# Patient Record
Sex: FEMALE | Race: BLACK OR AFRICAN AMERICAN | Employment: STUDENT | URBAN - METROPOLITAN AREA
[De-identification: names, ages, dates, MRNs, and addresses within clinical notes are randomized per-mention and may not be internally consistent; named-entity substitution may affect disease eponyms.]

---
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
## 2023-06-07 ENCOUNTER — APPOINTMENT (OUTPATIENT)
Age: 23
End: 2023-06-07
Payer: MEDICAID

## 2023-06-07 ENCOUNTER — HOSPITAL ENCOUNTER (EMERGENCY)
Age: 23
Discharge: HOME | End: 2023-06-07
Attending: EMERGENCY MEDICINE
Payer: MEDICAID

## 2023-06-07 VITALS
DIASTOLIC BLOOD PRESSURE: 83 MMHG | SYSTOLIC BLOOD PRESSURE: 142 MMHG | TEMPERATURE: 98.4 F | RESPIRATION RATE: 16 BRPM | OXYGEN SATURATION: 97 % | WEIGHT: 254.6 LBS | HEART RATE: 71 BPM

## 2023-06-07 DIAGNOSIS — R56.9 SEIZURE-LIKE ACTIVITY (HCC): Primary | ICD-10-CM

## 2023-06-07 DIAGNOSIS — E87.6 HYPOKALEMIA: ICD-10-CM

## 2023-06-07 DIAGNOSIS — F29 PSYCHOSIS, UNSPECIFIED PSYCHOSIS TYPE (HCC): ICD-10-CM

## 2023-06-07 LAB
ANION GAP SERPL CALC-SCNC: 4 MMOL/L (ref 3–18)
APAP SERPL-MCNC: <2 UG/ML (ref 10–30)
BASOPHILS # BLD: 0.1 K/UL (ref 0–0.1)
BASOPHILS NFR BLD: 1 % (ref 0–2)
BUN SERPL-MCNC: 6 MG/DL (ref 7–18)
BUN/CREAT SERPL: 8 (ref 12–20)
CA-I BLD-MCNC: 8.6 MG/DL (ref 8.5–10.1)
CHLORIDE SERPL-SCNC: 108 MMOL/L (ref 100–111)
CO2 SERPL-SCNC: 28 MMOL/L (ref 21–32)
CREAT SERPL-MCNC: 0.73 MG/DL (ref 0.6–1.3)
DIFFERENTIAL METHOD BLD: ABNORMAL
EOSINOPHIL # BLD: 0.2 K/UL (ref 0–0.4)
EOSINOPHIL NFR BLD: 2 % (ref 0–5)
ERYTHROCYTE [DISTWIDTH] IN BLOOD BY AUTOMATED COUNT: 18 % (ref 11.6–14.5)
ETHANOL SERPL-MCNC: <3 MG/DL (ref 0–3)
GLUCOSE SERPL-MCNC: 118 MG/DL (ref 74–99)
HCG SERPL QL: NEGATIVE
HCT VFR BLD AUTO: 34.7 % (ref 35–45)
HGB BLD-MCNC: 10.3 G/DL (ref 12–16)
IMM GRANULOCYTES # BLD AUTO: 0.1 K/UL (ref 0–0.04)
IMM GRANULOCYTES NFR BLD AUTO: 1 % (ref 0–0.5)
LYMPHOCYTES # BLD: 2.6 K/UL (ref 0.9–3.6)
LYMPHOCYTES NFR BLD: 25 % (ref 21–52)
MCH RBC QN AUTO: 23.1 PG (ref 24–34)
MCHC RBC AUTO-ENTMCNC: 29.7 G/DL (ref 31–37)
MCV RBC AUTO: 78 FL (ref 78–100)
MONOCYTES # BLD: 0.3 K/UL (ref 0.05–1.2)
MONOCYTES NFR BLD: 3 % (ref 3–10)
NEUTS SEG # BLD: 7.3 K/UL (ref 1.8–8)
NEUTS SEG NFR BLD: 68 % (ref 40–73)
NRBC # BLD: 0 K/UL (ref 0–0.01)
NRBC BLD-RTO: 0 PER 100 WBC
PLATELET # BLD AUTO: 374 K/UL (ref 135–420)
PMV BLD AUTO: 9.3 FL (ref 9.2–11.8)
POTASSIUM SERPL-SCNC: 3 MMOL/L (ref 3.5–5.5)
RBC # BLD AUTO: 4.45 M/UL (ref 4.2–5.3)
SALICYLATES SERPL-MCNC: 3.2 MG/DL (ref 2.8–20)
SODIUM SERPL-SCNC: 140 MMOL/L (ref 136–145)
WBC # BLD AUTO: 10.4 K/UL (ref 4.6–13.2)

## 2023-06-07 PROCEDURE — 36415 COLL VENOUS BLD VENIPUNCTURE: CPT

## 2023-06-07 PROCEDURE — 70450 CT HEAD/BRAIN W/O DYE: CPT

## 2023-06-07 PROCEDURE — 80143 DRUG ASSAY ACETAMINOPHEN: CPT

## 2023-06-07 PROCEDURE — 99284 EMERGENCY DEPT VISIT MOD MDM: CPT

## 2023-06-07 PROCEDURE — 80048 BASIC METABOLIC PNL TOTAL CA: CPT

## 2023-06-07 PROCEDURE — 85025 COMPLETE CBC W/AUTO DIFF WBC: CPT

## 2023-06-07 PROCEDURE — 84703 CHORIONIC GONADOTROPIN ASSAY: CPT

## 2023-06-07 PROCEDURE — 82077 ASSAY SPEC XCP UR&BREATH IA: CPT

## 2023-06-07 PROCEDURE — 80179 DRUG ASSAY SALICYLATE: CPT

## 2023-06-07 PROCEDURE — 80178 ASSAY OF LITHIUM: CPT

## 2023-06-07 PROCEDURE — 6370000000 HC RX 637 (ALT 250 FOR IP): Performed by: EMERGENCY MEDICINE

## 2023-06-07 RX ORDER — POTASSIUM CHLORIDE 750 MG/1
20 TABLET, EXTENDED RELEASE ORAL ONCE
Status: COMPLETED | OUTPATIENT
Start: 2023-06-07 | End: 2023-06-07

## 2023-06-07 RX ORDER — LORAZEPAM 1 MG/1
1 TABLET ORAL ONCE
Status: COMPLETED | OUTPATIENT
Start: 2023-06-07 | End: 2023-06-07

## 2023-06-07 RX ORDER — LITHIUM CARBONATE 150 MG/1
450 CAPSULE ORAL 2 TIMES DAILY WITH MEALS
COMMUNITY

## 2023-06-07 RX ADMIN — LORAZEPAM 1 MG: 1 TABLET ORAL at 20:19

## 2023-06-07 RX ADMIN — POTASSIUM CHLORIDE 20 MEQ: 750 TABLET, EXTENDED RELEASE ORAL at 21:32

## 2023-06-07 ASSESSMENT — PAIN - FUNCTIONAL ASSESSMENT: PAIN_FUNCTIONAL_ASSESSMENT: NONE - DENIES PAIN

## 2023-06-07 NOTE — ED NOTES
Hx of seizures per EMS. EMS picked her up at [facility] in [place]. Pt states \"implant makes her have seizures\". She wants it out. Pt has no allergies.      Ismael Lawrence  06/07/23 2000

## 2023-06-08 LAB — LITHIUM SERPL-SCNC: 0.5 MMOL/L (ref 0.6–1.2)

## 2023-06-08 NOTE — DISCHARGE INSTRUCTIONS
Return for pain, fever not resolving with motrin or tylenol, shortness of breath, vomiting, decreased fluid intake, weakness, numbness, dizziness, or any change or concerns or any worsening depression/suicidal thoughts, or thoughts of hurting anyone else.

## 2023-06-08 NOTE — ED NOTES
EMS states they witnessed pt seizure like activity. EMS stated patient was awake, alert, oriented x4 stating I am seizing. EMS states patients fingers were wiggling and she was blinking her eyes. They stated patient was able to get up and walk to the stretcher while this seizure like activity was happening.       Azra Thomas RN  06/07/23 2010

## 2023-06-08 NOTE — ED NOTES
In to see patient who arrived via ems. Pt being very uncooperative with triage. Pt stated \"I did not even want to come here. \" I asked patient would she liked to be seen. Pt stated \"I don't know, yall cant do anything for me and I dont have a ride home. \" Pt Then stated \"I told them I did not want to come. \" When asked what her concerns were this evening patient stated she wanted the implant out of her arm. Pt was brought in for seizures by EMS. Pt states she does not want anything to be done about her seizures but wants the implant out of her arm. Advised patient MD would be happy to see her if she wanted to be seen. Pt then stated \" Just go on, yall aint listening to me, just go on. \" Asked patient again if she would like to be seen and informed her we would be happy to address her concerns. Pt then stated \" Well I'm here aint I. \" Md made aware and in room speaking with patient.       Lambert Dubose RN  06/07/23 2007

## 2023-06-08 NOTE — ED NOTES
Pt states she sees people who are not there and hears voices. Pt denies Suicidal or homicidal ideation.       Emili Bhat RN  06/07/23 2024

## 2023-06-08 NOTE — ED PROVIDER NOTES
medications on file     Controlled Substances Monitoring:     No flowsheet data found.     (Please note that portions of this note were completed with a voice recognition program.  Efforts were made to edit the dictations but occasionally words are mis-transcribed.)    Naveed Gomez MD (electronically signed)  Attending Emergency Physician          Omaira Alexander MD  06/08/23 8486

## 2023-06-08 NOTE — ED NOTES
Pt asked to use the restroom, was given a urine specimen cup, however CT tech came to get pt and she stated she was unable to give sample     Lindsey Roberto  06/07/23 2055

## 2023-07-21 ENCOUNTER — HOSPITAL ENCOUNTER (INPATIENT)
Facility: HOSPITAL | Age: 23
LOS: 9 days | Discharge: HOME | DRG: 753 | End: 2023-07-31
Attending: STUDENT IN AN ORGANIZED HEALTH CARE EDUCATION/TRAINING PROGRAM | Admitting: PSYCHIATRY & NEUROLOGY
Payer: MEDICAID

## 2023-07-21 DIAGNOSIS — F31.9 BIPOLAR 1 DISORDER (HCC): ICD-10-CM

## 2023-07-21 DIAGNOSIS — R45.851 SUICIDAL IDEATION: Primary | ICD-10-CM

## 2023-07-21 DIAGNOSIS — F32.A DEPRESSION, UNSPECIFIED DEPRESSION TYPE: ICD-10-CM

## 2023-07-21 DIAGNOSIS — F20.9 SCHIZOPHRENIA, UNSPECIFIED TYPE (HCC): ICD-10-CM

## 2023-07-21 LAB
ALBUMIN SERPL-MCNC: 3.4 G/DL (ref 3.4–5)
ALBUMIN/GLOB SERPL: 0.8 (ref 0.8–1.7)
ALP SERPL-CCNC: 59 U/L (ref 45–117)
ALT SERPL-CCNC: 14 U/L (ref 13–56)
AMPHET UR QL SCN: NEGATIVE
ANION GAP SERPL CALC-SCNC: 4 MMOL/L (ref 3–18)
APAP SERPL-MCNC: <2 UG/ML (ref 10–30)
APPEARANCE UR: CLEAR
AST SERPL-CCNC: 5 U/L (ref 10–38)
BARBITURATES UR QL SCN: NEGATIVE
BASOPHILS # BLD: 0 K/UL (ref 0–0.1)
BASOPHILS NFR BLD: 0 % (ref 0–2)
BENZODIAZ UR QL: NEGATIVE
BILIRUB SERPL-MCNC: 0.2 MG/DL (ref 0.2–1)
BILIRUB UR QL: NEGATIVE
BUN SERPL-MCNC: 10 MG/DL (ref 7–18)
BUN/CREAT SERPL: 14 (ref 12–20)
CALCIUM SERPL-MCNC: 8.7 MG/DL (ref 8.5–10.1)
CANNABINOIDS UR QL SCN: NEGATIVE
CHLORIDE SERPL-SCNC: 108 MMOL/L (ref 100–111)
CO2 SERPL-SCNC: 26 MMOL/L (ref 21–32)
COCAINE UR QL SCN: NEGATIVE
COLOR UR: YELLOW
CREAT SERPL-MCNC: 0.7 MG/DL (ref 0.6–1.3)
DIFFERENTIAL METHOD BLD: ABNORMAL
EOSINOPHIL # BLD: 0.2 K/UL (ref 0–0.4)
EOSINOPHIL NFR BLD: 2 % (ref 0–5)
ERYTHROCYTE [DISTWIDTH] IN BLOOD BY AUTOMATED COUNT: 14.6 % (ref 11.6–14.5)
ETHANOL SERPL-MCNC: <3 MG/DL (ref 0–3)
GLOBULIN SER CALC-MCNC: 4.1 G/DL (ref 2–4)
GLUCOSE SERPL-MCNC: 120 MG/DL (ref 74–99)
GLUCOSE UR STRIP.AUTO-MCNC: NEGATIVE MG/DL
HCG SERPL-ACNC: <1 MIU/ML (ref 0–10)
HCT VFR BLD AUTO: 33.9 % (ref 35–45)
HGB BLD-MCNC: 10.1 G/DL (ref 12–16)
HGB UR QL STRIP: NEGATIVE
IMM GRANULOCYTES # BLD AUTO: 0.1 K/UL (ref 0–0.04)
IMM GRANULOCYTES NFR BLD AUTO: 0 % (ref 0–0.5)
KETONES UR QL STRIP.AUTO: NEGATIVE MG/DL
LEUKOCYTE ESTERASE UR QL STRIP.AUTO: NEGATIVE
LYMPHOCYTES # BLD: 3.3 K/UL (ref 0.9–3.6)
LYMPHOCYTES NFR BLD: 27 % (ref 21–52)
Lab: NORMAL
MAGNESIUM SERPL-MCNC: 2.3 MG/DL (ref 1.6–2.6)
MCH RBC QN AUTO: 23.1 PG (ref 24–34)
MCHC RBC AUTO-ENTMCNC: 29.8 G/DL (ref 31–37)
MCV RBC AUTO: 77.6 FL (ref 78–100)
METHADONE UR QL: NEGATIVE
MONOCYTES # BLD: 0.4 K/UL (ref 0.05–1.2)
MONOCYTES NFR BLD: 3 % (ref 3–10)
NEUTS SEG # BLD: 8 K/UL (ref 1.8–8)
NEUTS SEG NFR BLD: 67 % (ref 40–73)
NITRITE UR QL STRIP.AUTO: NEGATIVE
NRBC # BLD: 0 K/UL (ref 0–0.01)
NRBC BLD-RTO: 0 PER 100 WBC
OPIATES UR QL: NEGATIVE
PCP UR QL: NEGATIVE
PH UR STRIP: 6 (ref 5–8)
PLATELET # BLD AUTO: 390 K/UL (ref 135–420)
PMV BLD AUTO: 9.2 FL (ref 9.2–11.8)
POTASSIUM SERPL-SCNC: 3.4 MMOL/L (ref 3.5–5.5)
PROT SERPL-MCNC: 7.5 G/DL (ref 6.4–8.2)
PROT UR STRIP-MCNC: NEGATIVE MG/DL
RBC # BLD AUTO: 4.37 M/UL (ref 4.2–5.3)
SALICYLATES SERPL-MCNC: <1.7 MG/DL (ref 2.8–20)
SODIUM SERPL-SCNC: 138 MMOL/L (ref 136–145)
SP GR UR REFRACTOMETRY: 1.01 (ref 1–1.03)
UROBILINOGEN UR QL STRIP.AUTO: 0.2 EU/DL (ref 0.2–1)
WBC # BLD AUTO: 11.9 K/UL (ref 4.6–13.2)

## 2023-07-21 PROCEDURE — 80307 DRUG TEST PRSMV CHEM ANLYZR: CPT

## 2023-07-21 PROCEDURE — 80053 COMPREHEN METABOLIC PANEL: CPT

## 2023-07-21 PROCEDURE — 84702 CHORIONIC GONADOTROPIN TEST: CPT

## 2023-07-21 PROCEDURE — 80143 DRUG ASSAY ACETAMINOPHEN: CPT

## 2023-07-21 PROCEDURE — 85025 COMPLETE CBC W/AUTO DIFF WBC: CPT

## 2023-07-21 PROCEDURE — 80179 DRUG ASSAY SALICYLATE: CPT

## 2023-07-21 PROCEDURE — 6370000000 HC RX 637 (ALT 250 FOR IP): Performed by: NURSE PRACTITIONER

## 2023-07-21 PROCEDURE — 82077 ASSAY SPEC XCP UR&BREATH IA: CPT

## 2023-07-21 PROCEDURE — 99285 EMERGENCY DEPT VISIT HI MDM: CPT

## 2023-07-21 PROCEDURE — 81003 URINALYSIS AUTO W/O SCOPE: CPT

## 2023-07-21 PROCEDURE — 83735 ASSAY OF MAGNESIUM: CPT

## 2023-07-21 RX ORDER — POTASSIUM CHLORIDE 20 MEQ/1
20 TABLET, EXTENDED RELEASE ORAL ONCE
Status: COMPLETED | OUTPATIENT
Start: 2023-07-21 | End: 2023-07-21

## 2023-07-21 RX ADMIN — POTASSIUM CHLORIDE 20 MEQ: 1500 TABLET, EXTENDED RELEASE ORAL at 20:10

## 2023-07-21 ASSESSMENT — PAIN DESCRIPTION - PAIN TYPE: TYPE: CHRONIC PAIN

## 2023-07-21 ASSESSMENT — LIFESTYLE VARIABLES
HOW MANY STANDARD DRINKS CONTAINING ALCOHOL DO YOU HAVE ON A TYPICAL DAY: 1 OR 2
HOW OFTEN DO YOU HAVE A DRINK CONTAINING ALCOHOL: MONTHLY OR LESS

## 2023-07-21 ASSESSMENT — PAIN DESCRIPTION - LOCATION: LOCATION: GENERALIZED

## 2023-07-21 ASSESSMENT — PAIN SCALES - GENERAL: PAINLEVEL_OUTOF10: 5

## 2023-07-21 ASSESSMENT — PAIN DESCRIPTION - DESCRIPTORS: DESCRIPTORS: ACHING

## 2023-07-21 ASSESSMENT — PAIN DESCRIPTION - ORIENTATION: ORIENTATION: RIGHT

## 2023-07-21 NOTE — ED NOTES
PT changed into paper scrubs and belonings were placed in a single bag in locker three bottom shelf.      Coty Less  07/21/23 4259

## 2023-07-21 NOTE — ED PROVIDER NOTES
either signs or Co-signs this chart in the absence of a cardiologist.    RADIOLOGY:   Non-plain film images such as CT, Ultrasound and MRI are read by the radiologist. Plain radiographic images are visualized and preliminarily interpreted by the emergency physician with the below findings:    Radiologic Studies -   No orders to display       The laboratory results, imaging results, and other diagnostic exams were reviewed in the EMR. Medical Decision Making   I am the first provider for this patient. I reviewed the vital signs, available nursing notes, past medical history, past surgical history, family history and social history. Vital Signs-Reviewed the patient's vital signs. Records Reviewed: Nursing Notes and Old Medical Records Personally, on initial evaluation (Time of Review: 10:29 PM)      ED Course: Progress Notes, Reevaluation, and Consults:  12 59-year-old female presents emergency department with reports of SI no plan. Will obtain lab work and imaging for further evaluation of patients complaint. Will continue to monitor and evaluate patient while in the ED. Orders as below:  Orders Placed This Encounter   Procedures    CBC with Auto Differential    CMP    Salicylate    ETOH    Magnesium    Urine Drug Screen    HCG, Quantitative, Pregnancy    Urinalysis    Acetaminophen Level    ADULT DIET; Regular; Safety Tray; Safety Tray (Disposables)    Complete CSSRS Screen    Complete SBIRT Screen    IP CONSULT TO BSMART    Suicide precautions (Select if Suicidal)      CBC shows mild anemia. No signs of infection   acetaminophen level less than 2   Salicylate less than 1.7  Urine drug screen negative   ethanol less than 3   hCG negative   CMP does show potassium 3.4 replaced with 20 mEq of potassium. Medically cleared for psych        [phone] patient has been accepted to inpatient psych. Awaiting bed placement. Diagnosis     Clinical Impression:   1. Suicidal ideation    2.  Depression,

## 2023-07-21 NOTE — ED TRIAGE NOTES
Client reports having thoughts of hurting herself 15min prior to arrival with no plan. Client reports taking her meds as schedule but medication isn't  therapeutic at this present time. Client reports having life stresses that she is dealing with. AXOX4. Reports needing to speak to anoop and get help with better medication managament.

## 2023-07-22 PROCEDURE — 1240000000 HC EMOTIONAL WELLNESS R&B

## 2023-07-22 PROCEDURE — 6370000000 HC RX 637 (ALT 250 FOR IP): Performed by: PSYCHIATRY & NEUROLOGY

## 2023-07-22 PROCEDURE — 99221 1ST HOSP IP/OBS SF/LOW 40: CPT | Performed by: PSYCHIATRY & NEUROLOGY

## 2023-07-22 RX ORDER — FERROUS SULFATE 325(65) MG
325 TABLET ORAL
Status: DISCONTINUED | OUTPATIENT
Start: 2023-07-23 | End: 2023-07-31 | Stop reason: HOSPADM

## 2023-07-22 RX ORDER — POLYETHYLENE GLYCOL 3350 17 G/17G
17 POWDER, FOR SOLUTION ORAL DAILY PRN
Status: DISCONTINUED | OUTPATIENT
Start: 2023-07-22 | End: 2023-07-31 | Stop reason: HOSPADM

## 2023-07-22 RX ORDER — ACETAMINOPHEN 325 MG/1
650 TABLET ORAL EVERY 4 HOURS PRN
Status: DISCONTINUED | OUTPATIENT
Start: 2023-07-22 | End: 2023-07-31 | Stop reason: HOSPADM

## 2023-07-22 RX ADMIN — LITHIUM CARBONATE 450 MG: 300 CAPSULE, GELATIN COATED ORAL at 21:47

## 2023-07-22 ASSESSMENT — SLEEP AND FATIGUE QUESTIONNAIRES
SLEEP PATTERN: DIFFICULTY FALLING ASLEEP
AVERAGE NUMBER OF SLEEP HOURS: 1
DO YOU USE A SLEEP AID: NO
DO YOU HAVE DIFFICULTY SLEEPING: YES

## 2023-07-22 NOTE — ED NOTES
Assumed care of pt reports pain 5/10 generalized right side pain chronic. Pt reports family support from grandmother and has 1 son. Pt reports looking for medication management and outpatient support.       Zafar Cano RN  07/21/23 2017

## 2023-07-22 NOTE — ED NOTES
Pt to ED denies SI/HI at this time. Pt sates was upset and stressed and overwhelmed at the time of triage was SI denied HI at that time. Pt is calm cooperative and talkative. Pt alert and oriented.       Graciela Cantu RN  07/21/23 2008

## 2023-07-22 NOTE — CONSULTS
regular rate and rhythm, S1, S2 normal, no murmur, click, rub or gallop  Lungs: chest clear, no wheezing, rales, normal symmetric air entry    Abdomen: Soft, non-distended, non-TTP, no organomegaly, no masses  Ext: No calf tenderness, peripheral pulses present, no significant edema.   Skin: warm, dry, intact, no significant rashes/petechia/ecchymosis appreciated  Neuro: No focal neurologic deficits or gross abnormalities  Psych: A&Ox3, mildly pressured speech, calm    LABS, IMAGING, AND DIAGNOSTIC STUDIES  Recent Results (from the past 24 hour(s))   CBC with Auto Differential    Collection Time: 07/21/23  6:26 PM   Result Value Ref Range    WBC 11.9 4.6 - 13.2 K/uL    RBC 4.37 4.20 - 5.30 M/uL    Hemoglobin 10.1 (L) 12.0 - 16.0 g/dL    Hematocrit 33.9 (L) 35.0 - 45.0 %    MCV 77.6 (L) 78.0 - 100.0 FL    MCH 23.1 (L) 24.0 - 34.0 PG    MCHC 29.8 (L) 31.0 - 37.0 g/dL    RDW 14.6 (H) 11.6 - 14.5 %    Platelets 326 439 - 064 K/uL    MPV 9.2 9.2 - 11.8 FL    Nucleated RBCs 0.0 0  WBC    nRBC 0.00 0.00 - 0.01 K/uL    Neutrophils % 67 40 - 73 %    Lymphocytes % 27 21 - 52 %    Monocytes % 3 3 - 10 %    Eosinophils % 2 0 - 5 %    Basophils % 0 0 - 2 %    Immature Granulocytes 0 0.0 - 0.5 %    Neutrophils Absolute 8.0 1.8 - 8.0 K/UL    Lymphocytes Absolute 3.3 0.9 - 3.6 K/UL    Monocytes Absolute 0.4 0.05 - 1.2 K/UL    Eosinophils Absolute 0.2 0.0 - 0.4 K/UL    Basophils Absolute 0.0 0.0 - 0.1 K/UL    Absolute Immature Granulocyte 0.1 (H) 0.00 - 0.04 K/UL    Differential Type AUTOMATED     CMP    Collection Time: 07/21/23  6:26 PM   Result Value Ref Range    Sodium 138 136 - 145 mmol/L    Potassium 3.4 (L) 3.5 - 5.5 mmol/L    Chloride 108 100 - 111 mmol/L    CO2 26 21 - 32 mmol/L    Anion Gap 4 3.0 - 18 mmol/L    Glucose 120 (H) 74 - 99 mg/dL    BUN 10 7.0 - 18 MG/DL    Creatinine 0.70 0.6 - 1.3 MG/DL    Bun/Cre Ratio 14 12 - 20      Est, Glom Filt Rate >60 >60 ml/min/1.73m2    Calcium 8.7 8.5 - 10.1 MG/DL    Total

## 2023-07-22 NOTE — ED NOTES
Patient is in bed , resting with eyes closed PLAN: Patient is alert and oriented to verbal stimuli. Will continue to monitor.       Tico Otto RN  07/22/23 1535

## 2023-07-22 NOTE — ED NOTES
Pt resting NAD, pt tolerating PO intake at this time. Pt voiced no complaints at  this time. Pt awaiting placement at this time.       Zafar Cano RN  07/21/23 7251

## 2023-07-23 PROCEDURE — 6370000000 HC RX 637 (ALT 250 FOR IP): Performed by: PSYCHIATRY & NEUROLOGY

## 2023-07-23 PROCEDURE — 99231 SBSQ HOSP IP/OBS SF/LOW 25: CPT | Performed by: PSYCHIATRY & NEUROLOGY

## 2023-07-23 PROCEDURE — 6370000000 HC RX 637 (ALT 250 FOR IP)

## 2023-07-23 PROCEDURE — 1240000000 HC EMOTIONAL WELLNESS R&B

## 2023-07-23 RX ORDER — CLOTRIMAZOLE AND BETAMETHASONE DIPROPIONATE 10; .64 MG/G; MG/G
CREAM TOPICAL 2 TIMES DAILY
Status: DISCONTINUED | OUTPATIENT
Start: 2023-07-23 | End: 2023-07-31 | Stop reason: HOSPADM

## 2023-07-23 RX ORDER — TERBINAFINE HYDROCHLORIDE 250 MG/1
250 TABLET ORAL DAILY
Status: DISCONTINUED | OUTPATIENT
Start: 2023-07-23 | End: 2023-07-31 | Stop reason: HOSPADM

## 2023-07-23 RX ADMIN — LITHIUM CARBONATE 450 MG: 300 CAPSULE, GELATIN COATED ORAL at 17:14

## 2023-07-23 RX ADMIN — FERROUS SULFATE TAB 325 MG (65 MG ELEMENTAL FE) 325 MG: 325 (65 FE) TAB at 08:11

## 2023-07-23 RX ADMIN — LITHIUM CARBONATE 450 MG: 300 CAPSULE, GELATIN COATED ORAL at 08:11

## 2023-07-23 NOTE — H&P
CHIEF COMPLAINT:     \" Mood swings\"     HISTORY OF PRESENT ILLNESS:   (Including onset of illness/circumstances leading to admission)      Clarissa Nash is a 20 y.o. female with h/o of bipolar disorder. He was hospitalized in the context of worsening depression and mood lability. He states that her aunt and uncle  2 months ago that she was quite close to them. He states one of her other aunts has been diagnosed with lung cancer and she worries about losing her. Patient states that she was living with her grandmother but is facing financial hardship. So she moved to a group home through [address] [place] [address]. She has a 11year-old child and her grandmother is taking care of her. He states that she has been on lithium since age 15 but recently has started to experience voices. She reports voices telling her to \"do bad things. She is requesting to be on additional medication for mood and hallucinations. The patient denies the access to guns. The patient deniesthe desire to obtain a gun to harm self or others. No new Assessment & Plan notes have been filed under this hospital service since the last note was generated. Service: Behavioral          CURRENT PSYCHIATRIC TREATMENT:       She follows up with Dr. Rodriguez Seen at  [address] [place] [address]. PAST PSYCHIATRIC HISTORY OVER LIFETIME:       She reports previous hospitalization around age 14 for depression. He states that he mostly has been stable on lithium until recently. He denies any previous history of self-harm.         TRAUMA HISTORY OVER LIFETIME (type and age occurred):    Patient Denies    FAMILY PSYCHIATRIC HISTORY (Psychiatric, substancPe abuse, suicides, suicide attempts):    Patient Denies     PAST MEDICAL/SURGICAL HISTORY:     History of head trauma/concussions: Denies  History of seizures: Denies          ALLERGIES:     Denies    PRIOR TO ADMISSION MEDICATIONS:    Lithium 450 mg BID       FAMILY MEDICAL HISTORY:  Her family history is not on

## 2023-07-24 ENCOUNTER — APPOINTMENT (OUTPATIENT)
Facility: HOSPITAL | Age: 23
DRG: 753 | End: 2023-07-24
Payer: MEDICAID

## 2023-07-24 PROBLEM — F31.5: Status: ACTIVE | Noted: 2023-07-22

## 2023-07-24 LAB — LITHIUM SERPL-SCNC: 0.4 MMOL/L (ref 0.6–1.2)

## 2023-07-24 PROCEDURE — 1240000000 HC EMOTIONAL WELLNESS R&B

## 2023-07-24 PROCEDURE — 36415 COLL VENOUS BLD VENIPUNCTURE: CPT

## 2023-07-24 PROCEDURE — 6370000000 HC RX 637 (ALT 250 FOR IP): Performed by: PSYCHIATRY & NEUROLOGY

## 2023-07-24 PROCEDURE — 80178 ASSAY OF LITHIUM: CPT

## 2023-07-24 PROCEDURE — 6370000000 HC RX 637 (ALT 250 FOR IP)

## 2023-07-24 PROCEDURE — 99232 SBSQ HOSP IP/OBS MODERATE 35: CPT | Performed by: PSYCHIATRY & NEUROLOGY

## 2023-07-24 PROCEDURE — 70551 MRI BRAIN STEM W/O DYE: CPT

## 2023-07-24 RX ORDER — LURASIDONE HYDROCHLORIDE 20 MG/1
40 TABLET, FILM COATED ORAL
Status: DISCONTINUED | OUTPATIENT
Start: 2023-07-24 | End: 2023-07-24

## 2023-07-24 RX ORDER — LURASIDONE HYDROCHLORIDE 40 MG/1
40 TABLET, FILM COATED ORAL
Status: DISCONTINUED | OUTPATIENT
Start: 2023-07-24 | End: 2023-07-31 | Stop reason: HOSPADM

## 2023-07-24 RX ADMIN — LITHIUM CARBONATE 450 MG: 300 CAPSULE, GELATIN COATED ORAL at 09:00

## 2023-07-24 RX ADMIN — LURASIDONE HYDROCHLORIDE 40 MG: 40 TABLET ORAL at 17:34

## 2023-07-24 RX ADMIN — LITHIUM CARBONATE 450 MG: 300 CAPSULE, GELATIN COATED ORAL at 17:33

## 2023-07-24 RX ADMIN — TERBINAFINE TABLETS 250 MG 250 MG: 250 TABLET ORAL at 09:00

## 2023-07-24 RX ADMIN — ACETAMINOPHEN 325MG 650 MG: 325 TABLET ORAL at 14:19

## 2023-07-24 RX ADMIN — FERROUS SULFATE TAB 325 MG (65 MG ELEMENTAL FE) 325 MG: 325 (65 FE) TAB at 09:00

## 2023-07-24 ASSESSMENT — PAIN SCALES - GENERAL: PAINLEVEL_OUTOF10: 0

## 2023-07-24 NOTE — BH NOTE
2119-  Assumed care of patient alert and oriented x 4 in day area watching tv and conversing appropriately with peers. Pt has been observed laughing and talking loudly. Pt appears bizarre at times. Denied si/hi/avh during time of assessment.

## 2023-07-24 NOTE — GROUP NOTE
Group Therapy Note    Date: 7/24/2023    Group Start Time: 0745  Group End Time: 0800  Group Topic: Nursing    [facility] ADULT CHEM DEP    Shazia Shah RN    Spirituality-Prayers that Get Answered    Group Therapy Note    Attendees: 8       Patient's Goal:  \"I want to go home today\"    Notes:  prayer used as a coping mechanism    Status After Intervention:  Improved    Participation Level: Minimal    Participation Quality: Appropriate      Speech:  normal      Thought Process/Content: Logical      Affective Functioning: Congruent      Mood: euthymic      Level of consciousness:  Alert      Response to Learning: Able to verbalize current knowledge/experience      Endings: None Reported    Modes of Intervention: Media      Discipline Responsible: Registered Nurse      Signature:  Shazia Shah RN

## 2023-07-24 NOTE — GROUP NOTE
Group Therapy Note    Date: 7/24/2023    Group Start Time: 0930  Group End Time: 9930  Group Topic: Recreational        [address]        Group Therapy Note    Attendees: 10        Group Type: Exercise     This group focus on releasing endorphins and serotonin that help improve patients mood            Status After Intervention:  Unchanged    Participation Level:  Active Listener and Interactive    Participation Quality: Appropriate, Attentive, and Sharing      Speech:  normal      Thought Process/Content: Logical      Affective Functioning: Congruent      Mood: euphoric      Level of consciousness:  Alert and Attentive        Endings: None Reported      Modes of Intervention: Activity and Movement    Recreational Therapist  [address]

## 2023-07-24 NOTE — BH NOTE
Patient presented to nurses station, \"somebody told me I was supposed to get Trazodone\". Medication has not been ordered. Patient refused her HS cream. When asked who she had the conversation with, patient laughed inappropriately and stated, \"I don't know\". Mood appears to be elevated. Speech is loud. A peer asked the med nurse if the patient had taken her meds. No response was given from staff. Several patients have asked for Trazodone during HS med pass.

## 2023-07-24 NOTE — GROUP NOTE
Group Therapy Note    Date: 7/24/2023    Group Start Time: 1500  Group End Time: 9301  Group Topic: Recreational        Ha Elmore        Group Therapy Note    Attendees: 8      Game: Music- Name that Tune    Group focus on reducing stress, anxiety, and symptoms of depression, while improving mood, well-being,  and communication with peers/ team.          Status After Intervention:  Unchanged    Participation Level:  Active Listener and Interactive    Participation Quality: Appropriate, Attentive, and Sharing      Speech:  normal      Thought Process/Content: Logical      Affective Functioning: Congruent and Incongruent      Mood: euphoric      Level of consciousness:  Alert and Attentive        Endings: None Reported      Modes of Intervention: Socialization, Activity, and Media    Recreational Therapist  Ha Elmore

## 2023-07-25 ENCOUNTER — APPOINTMENT (OUTPATIENT)
Facility: HOSPITAL | Age: 23
DRG: 753 | End: 2023-07-25
Payer: MEDICAID

## 2023-07-25 PROCEDURE — 99231 SBSQ HOSP IP/OBS SF/LOW 25: CPT | Performed by: PSYCHIATRY & NEUROLOGY

## 2023-07-25 PROCEDURE — 1240000000 HC EMOTIONAL WELLNESS R&B

## 2023-07-25 PROCEDURE — 6370000000 HC RX 637 (ALT 250 FOR IP): Performed by: PSYCHIATRY & NEUROLOGY

## 2023-07-25 PROCEDURE — 6370000000 HC RX 637 (ALT 250 FOR IP)

## 2023-07-25 PROCEDURE — 6370000000 HC RX 637 (ALT 250 FOR IP): Performed by: FAMILY MEDICINE

## 2023-07-25 RX ORDER — TRAZODONE HYDROCHLORIDE 50 MG/1
50 TABLET ORAL NIGHTLY PRN
Status: DISCONTINUED | OUTPATIENT
Start: 2023-07-25 | End: 2023-07-31 | Stop reason: HOSPADM

## 2023-07-25 RX ADMIN — LURASIDONE HYDROCHLORIDE 40 MG: 40 TABLET ORAL at 17:32

## 2023-07-25 RX ADMIN — LITHIUM CARBONATE 450 MG: 300 CAPSULE, GELATIN COATED ORAL at 17:31

## 2023-07-25 RX ADMIN — FERROUS SULFATE TAB 325 MG (65 MG ELEMENTAL FE) 325 MG: 325 (65 FE) TAB at 08:33

## 2023-07-25 RX ADMIN — LITHIUM CARBONATE 450 MG: 300 CAPSULE, GELATIN COATED ORAL at 08:33

## 2023-07-25 RX ADMIN — ACETAMINOPHEN 325MG 650 MG: 325 TABLET ORAL at 14:48

## 2023-07-25 RX ADMIN — CLOTRIMAZOLE AND BETAMETHASONE DIPROPIONATE: 10; .5 CREAM TOPICAL at 08:30

## 2023-07-25 RX ADMIN — TERBINAFINE TABLETS 250 MG 250 MG: 250 TABLET ORAL at 08:33

## 2023-07-25 ASSESSMENT — PAIN SCALES - GENERAL: PAINLEVEL_OUTOF10: 10

## 2023-07-25 NOTE — BH NOTE
PT appeared to have slept 7hrs thus far , occasionally waking to toilet. No disruptions were noted , will continue to monitor for changes in behaviors.

## 2023-07-25 NOTE — GROUP NOTE
Group Therapy Note    Date: 7/25/2023    Group Start Time: 0945  Group End Time: 1030  Group Topic: Activity      Lucian Olivas        Group Therapy Note    Attendees: 6/14    The group consisted of an art activity involving mandalas and coloring to promote stress reduction, relaxation, and use of the arts as a coping skill. Group members discussed how art and music can be used to cope with depression, anxiety, and anger. Notes:  Pt was tearful at the start of group. Pt's affect brightened as the group progressed while engaging in art activity and social interaction with peers. Pt stated that art and music were helpful for managing her anger.     Status After Intervention:  Improved    Participation Level: Interactive    Participation Quality: Appropriate, Attentive, and Sharing      Speech:  normal      Thought Process/Content: Logical  Linear      Affective Functioning: Congruent      Mood: euthymic and irritable      Level of consciousness:  Alert, Oriented x4, and Attentive      Response to Learning: Able to verbalize current knowledge/experience and Able to verbalize/acknowledge new learning      Endings: None Reported    Modes of Intervention: Support, Socialization, Exploration, Activity, and Media      Discipline Responsible: /Counselor      Signature:  Too Sinclair, MT-BC  Board-Certified Music Therapist  Licensed Professional Counselor

## 2023-07-25 NOTE — GROUP NOTE
Group Therapy Note    Date: 7/24/2023    Group Start Time: 2000  Group End Time: 2030  Group Topic: Nursing    [facility] ADULT CHEM DEP    Valeria Quiroz RN        Group Therapy Note    Attendees: 5           Notes:  Pt. attended the group.     Status After Intervention:  Unchanged    Participation Level: Minimal    Participation Quality: Attentive      Speech:  normal      Thought Process/Content: Logical      Affective Functioning: Congruent      Mood: euthymic      Level of consciousness:  Alert and Oriented x4      Response to Learning: Able to retain information      Endings: None Reported    Modes of Intervention: Education and Support      Discipline Responsible: Registered Nurse      Signature:  Valeria Quiroz RN

## 2023-07-25 NOTE — GROUP NOTE
Group Therapy Note    Date: 7/25/2023    Group Start Time: 1500  Group End Time: 9174  Group Topic: Psychoeducation        Kimmie Bernstein        Group Therapy Note    Attendees: [address] [facility] patients with identifying emotions and feelings that come with each identified emotion. Assisting patients with identifying coping methods for each emotion. Patient continuously blurted out comments that were irrelevant to the topic of the group discussion. When redirected the patient left the group and came back with minimal participation. Status After Intervention:  Unchanged    Participation Level:  Active Listener    Participation Quality: Inappropriate      Speech:  normal and loud      Thought Process/Content: Logical      Affective Functioning: Congruent      Level of consciousness:  Alert and Inattentive      Response to Learning: Able to verbalize current knowledge/experience, Able to verbalize/acknowledge new learning, and Able to retain information      Endings: None Reported    Modes of Intervention: Education, Support, and Socialization      Discipline Responsible: /Counselor      Signature:  Kimmie Bernstein

## 2023-07-25 NOTE — DISCHARGE INSTRUCTIONS
Patient has been referred and accepted with the [facility] [address] [facility]. The provider will call the nurses unit when transportation has been made available. [phone]

## 2023-07-25 NOTE — GROUP NOTE
Group Therapy Note    Date: 7/25/2023    Group Start Time: 1300  Group End Time: 0295  Group Topic: Music Therapy      Lori Guerrero        Group Therapy Note    Attendees: 5/13    Music therapy group utilized the intervention of jo ann analysis with patient preferred music where patients listened to and discussed songs that are personally meaningful to them. Group members discussed the music in relation to their lives and their mental health to include discussing topics such as overcoming obstacles, worry and negative thinking, unhealthy relationships, and how group members are coping with life stressors and working on improving their mental health. Notes:  Pt discussed relating to lyrics about having difficulty sleeping and relating to the lyrics, \"fighting to give up my pain, fighting to be on my izaiah. \" Pt elaborated that she is fighting for her mental health and that she has to stay in her own izaiah with regards to her reactions towards other people. Pt discussed relating to the song jo ann, \"I've been going to God\" and discussed how you can't buy peace or happiness. Pt discussed how sharing in this group was helpful for her and stated that she would like to see a therapist after discharge.     Status After Intervention:  Improved    Participation Level: Interactive    Participation Quality: Appropriate, Attentive, and Sharing      Speech:  normal      Thought Process/Content: Logical  Linear      Affective Functioning: Congruent      Mood: euthymic      Level of consciousness:  Alert, Oriented x4, and Attentive      Response to Learning: Able to verbalize current knowledge/experience, Able to verbalize/acknowledge new learning, Able to retain information, and Capable of insight      Endings: None Reported    Modes of Intervention: Support, Socialization, Exploration, and Media      Discipline Responsible: Social

## 2023-07-25 NOTE — GROUP NOTE
Group Therapy Note    Date: 7/25/2023    Group Start Time: 1400  Group End Time: 0476  Group Topic: Recreational        [address]        Group Therapy Note    Attendees: 10/13      Group Type: Game- Jeopardy with a Twist    RT used different mental health topics for categories such as: Acting or drawing out a emotion/feeling, unscrambling a mental health word, and songs that promote positive mental health. The focus of this group is to decrease symptoms of depression, anxiety, while improving mood. Also allow PT's to engage and problem solve as a team with their peers. Status After Intervention:  Unchanged    Participation Level:  Active Listener and Interactive    Participation Quality: Appropriate, Attentive, and Sharing      Speech:  normal      Thought Process/Content: Logical      Affective Functioning: Congruent      Mood: elevated and euphoric      Level of consciousness:  Alert and Attentive        Endings: None Reported      Modes of Intervention: Socialization, Problem-solving, and Activity      Recreational Therapist  [address]

## 2023-07-26 ENCOUNTER — APPOINTMENT (OUTPATIENT)
Facility: HOSPITAL | Age: 23
DRG: 753 | End: 2023-07-26
Payer: MEDICAID

## 2023-07-26 PROCEDURE — 6370000000 HC RX 637 (ALT 250 FOR IP)

## 2023-07-26 PROCEDURE — 1240000000 HC EMOTIONAL WELLNESS R&B

## 2023-07-26 PROCEDURE — 6370000000 HC RX 637 (ALT 250 FOR IP): Performed by: PSYCHIATRY & NEUROLOGY

## 2023-07-26 PROCEDURE — 6370000000 HC RX 637 (ALT 250 FOR IP): Performed by: FAMILY MEDICINE

## 2023-07-26 PROCEDURE — 99232 SBSQ HOSP IP/OBS MODERATE 35: CPT | Performed by: PSYCHIATRY & NEUROLOGY

## 2023-07-26 PROCEDURE — 72141 MRI NECK SPINE W/O DYE: CPT

## 2023-07-26 RX ADMIN — CLOTRIMAZOLE AND BETAMETHASONE DIPROPIONATE: 10; .5 CREAM TOPICAL at 08:13

## 2023-07-26 RX ADMIN — LITHIUM CARBONATE 450 MG: 300 CAPSULE, GELATIN COATED ORAL at 08:10

## 2023-07-26 RX ADMIN — TRAZODONE HYDROCHLORIDE 50 MG: 50 TABLET ORAL at 21:11

## 2023-07-26 RX ADMIN — LITHIUM CARBONATE 450 MG: 300 CAPSULE, GELATIN COATED ORAL at 17:33

## 2023-07-26 RX ADMIN — FERROUS SULFATE TAB 325 MG (65 MG ELEMENTAL FE) 325 MG: 325 (65 FE) TAB at 08:11

## 2023-07-26 RX ADMIN — TERBINAFINE TABLETS 250 MG 250 MG: 250 TABLET ORAL at 08:11

## 2023-07-26 RX ADMIN — LURASIDONE HYDROCHLORIDE 40 MG: 40 TABLET ORAL at 17:33

## 2023-07-26 NOTE — GROUP NOTE
Art Therapy Group Progress Note    PATIENT SCHEDULED FOR GROUP AT:  1:00 PM    GROUP STOP TIME:  1:45 PM     ATTENDANCE:  Low (4/11 participants)     TOPIC / FOCUS: Draw your Ideal Day or ideal quality of life     GOALS: define personal meaning of wellness and quality of life, identify obstacles, promote positive coping skills, identify goals and actions to increase overall wellness      PARTICIPATION LEVEL:  interactive, sharing     PARTICIPATION QUALITY:  appropriate, social, resistant to art making     ATTENTION LEVEL: low energy, low focus, low investment     LEVEL OF CONCIOUSNESS: alert, oriented X 3     SPEECH: normal     THOUGHT PROCESS/ CONTENT: logical     AFFECTIVE FUNCTIONING: incongruent, superficial,  significant use of humor     MOOD: depressed     SYMBOLIC & THEMATIC CONTENT AS NOTED IN IMAGERY:  Pt created an ideal day with her son and \"somebody else\" at the beach. Pt was hesitant to participate in art making because of her drawing capabilities. Pt identified her anger and reactivity to triggers as her biggest obstacle in her path to wellness. Pt said she typically acts out because she doesn't release any of her anger. Pt stated her most commonly used coping skill is music. Pt encouraged to try new coping skills that increase ability to release anger. Pt artwork was congruent with depressive symptoms. Pt tends to hide behind a wall of humor.      STATUS AFTER INTERVENTION: unchanged     RESPONSE TO LEARNING: able to verbalize current knowledge/ experience, able to verbalize/acknowledge new learning, capable of insight     ENDINGS: none reported    MODES OF INTERVENTION: exploration, art media, socialization, psychoeducation     DISCIPLINE RESPONSIBLE: Art Therapist     [facility]  Art Therapist, MA

## 2023-07-26 NOTE — GROUP NOTE
Group Therapy Note    Date: 7/26/2023    Group Start Time: 1500  Group End Time: 0994  Group Topic: Recreational    [facility] ADULT CHEM AKASH Dougherty        Group Therapy Note    Attendees: 8/11    Group Type: Musical Bean Bag toss    Game Description: Patients will pass a bean bag around while music is playing, when the music stops the patient holding the bean bag will answer the question or follow the prompt given. Questions examples: \"What are you looking forward to\", \"What are you grateful for today\", \"In one word, tell me how do you feel today? \" \" Describe your happiest moment \". If the patient feels down or answers negatively, peers will make suggestions to help the patient increase their feelings. Prompt examples: \"Sing your favorite song\", \"Make a poem about the word love\", \" Find a rhyme to: It's been a great day\". Group focus on building rapport, prosocial and coping skills with peers, facilitating emotional processing/discussions, improving attention span, and following directive. Notes:  PT had to leave 20 minutes in to group for a appointment/scan    Status After Intervention:  Unchanged    Participation Level:  Active Listener and Interactive    Participation Quality: Appropriate, Attentive, and Sharing      Speech:  normal      Thought Process/Content: Logical      Affective Functioning: Congruent      Mood: euthymic      Level of consciousness:  Alert and Attentive        Endings: None Reported      Modes of Intervention: Socialization, Activity, and Media      Recreational Therapist  Selena Traylor

## 2023-07-26 NOTE — BH NOTE
Patient came onto the annex (patient is assigned to the adult unit) to interact with a peer. Patient appeared to have what the undersigned interpreted as flirtatious behavior towards the peer. Patient was in peers personal space and advised that she had not received a snack on the adult unit yet. Patient then proceeded to take one of the peers juices and walk off. Patient was redirected to return the juice to the peer. Patient returned the juice and walked off the unit. Moments later, patient walked back onto the unit, looked at the undersigned and stated out loud, \"Ion even wanna be over here, for real\". Patient again exited the unit. Floor staff was advised to be vigilant about patient's behaviors when coming onto the annex.

## 2023-07-26 NOTE — BH NOTE
At the start of MHT group this writer told this pt \"The phones are going to be cut off for group. \" PT became agitated and stated \" I can't wait to get up out of here. Y'all are some rude bitches. You saw I was on the phone\" This writer attempted to reassure this pt that she would be able to get back on the phone after group. . I attempted to redirect this pt with no success. The RN then proceeded to talk to pt on another unit and was able to calm the pt down. Staff will continue to monitor for safety and any changes in behavior.

## 2023-07-27 PROCEDURE — 99232 SBSQ HOSP IP/OBS MODERATE 35: CPT | Performed by: PSYCHIATRY & NEUROLOGY

## 2023-07-27 PROCEDURE — 1240000000 HC EMOTIONAL WELLNESS R&B

## 2023-07-27 PROCEDURE — 6370000000 HC RX 637 (ALT 250 FOR IP): Performed by: PSYCHIATRY & NEUROLOGY

## 2023-07-27 PROCEDURE — 6370000000 HC RX 637 (ALT 250 FOR IP)

## 2023-07-27 PROCEDURE — 6370000000 HC RX 637 (ALT 250 FOR IP): Performed by: FAMILY MEDICINE

## 2023-07-27 RX ADMIN — LITHIUM CARBONATE 450 MG: 300 CAPSULE, GELATIN COATED ORAL at 08:32

## 2023-07-27 RX ADMIN — TRAZODONE HYDROCHLORIDE 50 MG: 50 TABLET ORAL at 20:35

## 2023-07-27 RX ADMIN — LITHIUM CARBONATE 450 MG: 300 CAPSULE, GELATIN COATED ORAL at 16:38

## 2023-07-27 RX ADMIN — TERBINAFINE TABLETS 250 MG 250 MG: 250 TABLET ORAL at 08:32

## 2023-07-27 RX ADMIN — LURASIDONE HYDROCHLORIDE 40 MG: 40 TABLET ORAL at 16:38

## 2023-07-27 RX ADMIN — ACETAMINOPHEN 325MG 650 MG: 325 TABLET ORAL at 19:24

## 2023-07-27 RX ADMIN — FERROUS SULFATE TAB 325 MG (65 MG ELEMENTAL FE) 325 MG: 325 (65 FE) TAB at 08:32

## 2023-07-27 ASSESSMENT — PAIN DESCRIPTION - LOCATION: LOCATION: GENERALIZED

## 2023-07-27 ASSESSMENT — PAIN SCALES - GENERAL: PAINLEVEL_OUTOF10: 5

## 2023-07-27 ASSESSMENT — PAIN DESCRIPTION - DESCRIPTORS: DESCRIPTORS: ACHING

## 2023-07-27 NOTE — GROUP NOTE
Group Therapy Note    Date: 7/26/2023    Group Start Time: 1930  Group End Time: 2000  Group Topic: Medication    [facility] 1 ADULT CHEM DEP    Davidson Charles RN        Group Therapy Note    Attendees: 8           Participation Level: active    Participation Quality: Appropriate      Speech:  normal      Thought Process/Content: Logical      Affective Functioning: Congruent      Mood: euthymic      Level of consciousness:  Alert      Response to Learning: Able to verbalize current knowledge/experience      Endings: None Reported    Modes of Intervention: Education      Discipline Responsible: Registered Nurse      Signature:  Davidson Charles RN

## 2023-07-27 NOTE — GROUP NOTE
Group Therapy Note    Date: 7/27/2023    Group Start Time: 1430  Group End Time: 1500  Group Topic: Psychoeducation        Tamar Hairston        Group Therapy Note    Attendees: 8  Group Focus- To identify strategies that helps patients cope during stressful and challenging times. Status After Intervention:  Unchanged    Participation Level:  Active Listener and Interactive    Participation Quality: Appropriate, Attentive, and Sharing      Speech:  normal      Thought Process/Content: Logical      Affective Functioning: Congruent      Level of consciousness:  Alert      Response to Learning: Able to verbalize current knowledge/experience, Able to verbalize/acknowledge new learning, and Able to retain information      Endings: None Reported    Modes of Intervention: Education, Support, and Socialization      Discipline Responsible: /Counselor      Signature:  Tamar Hairston

## 2023-07-27 NOTE — GROUP NOTE
Group Therapy Note    Date: 7/27/2023    Group Start Time: 0930  Group End Time: 7465  Group Topic: Music Therapy      Francia Mirza        Group Therapy Note    Attendees: 9/12    Group Focus: Music therapy group consisted of a mindfulness-based music listening exercise. Therapist provided psychoeducation on mindfulness and how to utilize music as a form of mindfulness. Group members listened, wrote, and discussed what they noticed in five songs from differing genres. Group members discussed first impressions, judgements, instruments and other musical elements, emotions in the music, thoughts, associations with music, and body sensations. The group may promote self-awareness, tolerance of negative emotions, tolerance of differing opinions and reactions, and use of music and mindfulness as coping skills. Notes:  Pt reported feeling \"depressed\" and like a \"2/10\" at the start of group. Pt actively engaged in group sharing about reactions to music in an appropriate way. Status After Intervention:  Unchanged    Participation Level:  Active Listener and Interactive    Participation Quality: Appropriate, Attentive, and Sharing      Speech:  normal      Thought Process/Content: Logical  Linear      Affective Functioning: Congruent      Mood: depressed      Level of consciousness:  Alert and Attentive      Response to Learning: Able to verbalize current knowledge/experience      Endings: None Reported    Modes of Intervention: Education, Support, Socialization, Exploration, and Media      Discipline Responsible: /Counselor      Signature:  Vinicius Light MT-BC  Licensed Professional Counselor  Board-Certified Music Therapist

## 2023-07-27 NOTE — GROUP NOTE
Group Therapy Note    Date: 7/27/2023    Group Start Time: 1500  Group End Time: 2241  Group Topic: Recreational    [facility] 1 ADULT CHEM DEP    Rubia Dougherty        Group Therapy Note    Attendees: 10/13    Group Type: Game:Family Feud/ Mental Health Family Feud    Group Focus: Recreational therapy group engaged patients through a group game. RT placed patients into two teams to encourage team building and asking a variety of  questions to encourage critical thinking. The group can assist in increasing positive mood, reduce stress, and using good social, coping and problem solving skills. Status After Intervention:  Unchanged    Participation Level:  Active Listener and Interactive    Participation Quality: Appropriate, Attentive, and Sharing      Speech:  normal      Thought Process/Content: Logical      Affective Functioning: Congruent      Mood: elevated and euthymic      Level of consciousness:  Alert and Attentive          Endings: None Reported      Modes of Intervention: Socialization, Problem-solving, and Activity       Recreational Therapist  Guicho Lam

## 2023-07-28 ENCOUNTER — APPOINTMENT (OUTPATIENT)
Facility: HOSPITAL | Age: 23
DRG: 753 | End: 2023-07-28
Payer: MEDICAID

## 2023-07-28 PROCEDURE — 1240000000 HC EMOTIONAL WELLNESS R&B

## 2023-07-28 PROCEDURE — 71045 X-RAY EXAM CHEST 1 VIEW: CPT

## 2023-07-28 PROCEDURE — 6370000000 HC RX 637 (ALT 250 FOR IP)

## 2023-07-28 PROCEDURE — 99232 SBSQ HOSP IP/OBS MODERATE 35: CPT | Performed by: PSYCHIATRY & NEUROLOGY

## 2023-07-28 PROCEDURE — 6370000000 HC RX 637 (ALT 250 FOR IP): Performed by: PSYCHIATRY & NEUROLOGY

## 2023-07-28 PROCEDURE — 6370000000 HC RX 637 (ALT 250 FOR IP): Performed by: FAMILY MEDICINE

## 2023-07-28 RX ADMIN — LURASIDONE HYDROCHLORIDE 40 MG: 40 TABLET ORAL at 16:46

## 2023-07-28 RX ADMIN — LITHIUM CARBONATE 450 MG: 300 CAPSULE, GELATIN COATED ORAL at 08:41

## 2023-07-28 RX ADMIN — TERBINAFINE TABLETS 250 MG 250 MG: 250 TABLET ORAL at 08:43

## 2023-07-28 RX ADMIN — CLOTRIMAZOLE AND BETAMETHASONE DIPROPIONATE: 10; .5 CREAM TOPICAL at 08:41

## 2023-07-28 RX ADMIN — LITHIUM CARBONATE 450 MG: 300 CAPSULE, GELATIN COATED ORAL at 16:46

## 2023-07-28 RX ADMIN — TRAZODONE HYDROCHLORIDE 50 MG: 50 TABLET ORAL at 20:29

## 2023-07-28 RX ADMIN — ACETAMINOPHEN 325MG 650 MG: 325 TABLET ORAL at 13:39

## 2023-07-28 RX ADMIN — FERROUS SULFATE TAB 325 MG (65 MG ELEMENTAL FE) 325 MG: 325 (65 FE) TAB at 08:41

## 2023-07-28 ASSESSMENT — PAIN SCALES - GENERAL
PAINLEVEL_OUTOF10: 0
PAINLEVEL_OUTOF10: 5

## 2023-07-28 ASSESSMENT — PAIN DESCRIPTION - ORIENTATION: ORIENTATION: RIGHT

## 2023-07-28 ASSESSMENT — PAIN DESCRIPTION - LOCATION: LOCATION: GENERALIZED

## 2023-07-28 ASSESSMENT — PAIN DESCRIPTION - DESCRIPTORS: DESCRIPTORS: ACHING

## 2023-07-28 NOTE — GROUP NOTE
Group Therapy Note    Date: 7/28/2023    Group Start Time: 0930  Group End Time: 9364  Group Topic: Recreational    [facility] 1 ADULT CHEM DEP    Rubia Dougherty        Group Therapy Note    Attendees: 8/12    Group Type: Exercise        This group focus on releasing endorphins and serotonin that help improve patients mood              Status After Intervention:  Unchanged    Participation Level:  Active Listener and Interactive    Participation Quality: Appropriate      Speech:  normal      Thought Process/Content: Logical      Affective Functioning: Congruent      Mood: euthymic      Level of consciousness:  Alert and Attentive        Endings: None Reported        Modes of Intervention: Socialization, Activity, and Movement      Recreational Therapist  Trung Melo

## 2023-07-28 NOTE — BH NOTE
EMILY Note: The above pt was approached by a male peers after group session was ended. The male peer voiced his opion to the above pt and walked to his unit. She was calm, and not argument caden and did not react to the comments that were made by the male peers. She was able to still engage with staff and peers during this shift. Staff will ocntinue to monitor during this shift.

## 2023-07-28 NOTE — GROUP NOTE
Group Therapy Note    Date: 7/28/2023    Group Start Time: 1500  Group End Time: 0858  Group Topic: Music Therapy    Dana Spine        Group Therapy Note    Attendees: 7/10    Group Focus: Music therapy group consisted of a musical game involving guessing healthy lifestyle strategies and coping skills present in a variety of songs. Group members identified the strategies and skills mentioned and discussed how they could apply the coping skills in their own lives. The group may improve mood, promote healthy socialization, problem-solving, and promote a variety of healthy lifestyle strategies and coping skills. Notes:  Pt actively engaged in group to include identifying and discussing multiple coping skills and healthy life strategies.     Status After Intervention:  Unchanged    Participation Level: Interactive    Participation Quality: Appropriate, Attentive, and Sharing      Speech:  normal      Thought Process/Content: Logical  Linear      Affective Functioning: Congruent      Mood: euthymic      Level of consciousness:  Alert and Attentive      Response to Learning: Able to verbalize current knowledge/experience and Able to verbalize/acknowledge new learning      Endings: None Reported    Modes of Intervention: Education, Socialization, Exploration, Problem-solving, and Media      Discipline Responsible: /Counselor      Signature:  LIZET Rain, [address] [facility] Music Therapist   Licensed Professional Counselor

## 2023-07-28 NOTE — BH NOTE
2123- Assumed care of pt alert and oriented x 4. Very animated and loud. Easy to redirect. Remains compliant with meds and unit policies. Prn med provided per pt request. Denied si/hi/avh during time of assessment. Poor insight observed. Pt was confused on whether to call her \"baby daddy\" before or after she received her prn med. Pt was encouraged to take med as requested and call her child's father afterwards. Pt agreed with plan. Denied si/hi/avh during time of assessment. Will continue to monitor and support as needed.

## 2023-07-28 NOTE — GROUP NOTE
Group Therapy Note    Date: 7/28/2023    Group Start Time: 1200  Group End Time: 1300  Group Topic: Psychotherapy    HAI Cross        Group Therapy Note    Attendees: 8    Group Topic: Hope    The purpose of the topic on hope was to help the patents to explore how having hope is a useful tool to combat feelings of depression. Status After Intervention:  Improved    Participation Level:  Active Listener and Interactive    Participation Quality: Appropriate, Sharing, and Supportive      Speech:  normal      Thought Process/Content: Logical      Affective Functioning: Congruent          Level of consciousness:  Alert, Oriented x4, and Attentive      Response to Learning: Able to verbalize current knowledge/experience, Able to verbalize/acknowledge new learning, Able to retain information, and Capable of insight      Endings: None Reported    Modes of Intervention: Education, Support, Socialization, Exploration, Clarifying, and Problem-solving      Discipline Responsible: /Counselor      Signature:  HAI Cross

## 2023-07-29 PROCEDURE — 99232 SBSQ HOSP IP/OBS MODERATE 35: CPT | Performed by: PSYCHIATRY & NEUROLOGY

## 2023-07-29 PROCEDURE — 1240000000 HC EMOTIONAL WELLNESS R&B

## 2023-07-29 PROCEDURE — 6370000000 HC RX 637 (ALT 250 FOR IP): Performed by: FAMILY MEDICINE

## 2023-07-29 PROCEDURE — 6370000000 HC RX 637 (ALT 250 FOR IP): Performed by: PSYCHIATRY & NEUROLOGY

## 2023-07-29 PROCEDURE — 6370000000 HC RX 637 (ALT 250 FOR IP)

## 2023-07-29 RX ADMIN — FERROUS SULFATE TAB 325 MG (65 MG ELEMENTAL FE) 325 MG: 325 (65 FE) TAB at 09:11

## 2023-07-29 RX ADMIN — LITHIUM CARBONATE 450 MG: 300 CAPSULE, GELATIN COATED ORAL at 09:11

## 2023-07-29 RX ADMIN — ACETAMINOPHEN 325MG 650 MG: 325 TABLET ORAL at 13:29

## 2023-07-29 RX ADMIN — TRAZODONE HYDROCHLORIDE 50 MG: 50 TABLET ORAL at 20:43

## 2023-07-29 RX ADMIN — CLOTRIMAZOLE AND BETAMETHASONE DIPROPIONATE: 10; .5 CREAM TOPICAL at 09:10

## 2023-07-29 RX ADMIN — LITHIUM CARBONATE 450 MG: 300 CAPSULE, GELATIN COATED ORAL at 17:15

## 2023-07-29 RX ADMIN — TERBINAFINE TABLETS 250 MG 250 MG: 250 TABLET ORAL at 09:12

## 2023-07-29 RX ADMIN — LURASIDONE HYDROCHLORIDE 40 MG: 40 TABLET ORAL at 17:15

## 2023-07-29 ASSESSMENT — PAIN DESCRIPTION - DESCRIPTORS: DESCRIPTORS: ACHING

## 2023-07-29 ASSESSMENT — PAIN SCALES - GENERAL
PAINLEVEL_OUTOF10: 5
PAINLEVEL_OUTOF10: 0
PAINLEVEL_OUTOF10: 0

## 2023-07-29 ASSESSMENT — PAIN DESCRIPTION - LOCATION: LOCATION: GENERALIZED

## 2023-07-29 NOTE — GROUP NOTE
Group Therapy Note    Date: 7/29/2023    Group Start Time: 1200  Group End Time: 2938  Group Topic: Nursing    [facility] 1 ADULT CHEM DEP    Terri Florian RN    Music-\"Name That Song\"    Group Therapy Note    Attendees: 5       Patient's Goal:  pt did not identify any goals    Notes:  n/a    Status After Intervention:  Unchanged    Participation Level: Interactive    Participation Quality: Appropriate      Speech:  normal      Thought Process/Content: Logical      Affective Functioning: Congruent      Mood: euthymic      Level of consciousness:  Alert      Response to Learning: Able to verbalize current knowledge/experience      Endings: None Reported    Modes of Intervention: Media      Discipline Responsible: Registered Nurse      Signature:  Terri Florian RN

## 2023-07-29 NOTE — BH NOTE
Patient spent majority of the shift 7 pm to 11 pm out in the dayroom talking with staff and her peers. Patient was pleasant and outgoing. Patient did not display any behavior issues or concerns. Staff will continue to monitor and document any changes in behavior.

## 2023-07-29 NOTE — GROUP NOTE
Art Therapy Group Progress Note    PATIENT SCHEDULED FOR GROUP AT:  12:00 PM    GROUP STOP TIME:  12:45 PM     ATTENDANCE:  Moderate (7/12 participants)     TOPIC / FOCUS: What I Have Control Over     GOALS:  identify areas of personal control, identify things that can be controlled, discuss consequences of actions, identify triggers, promote mindfulness, promote positive coping skills, increase creative problem solving     PARTICIPATION LEVEL:  interactive, sharing, contributed to group discussion, monopolizing      PARTICIPATION QUALITY:  appropriate, social      ATTENTION LEVEL:  improved focus on task, low energy, low investment, easily distracted      LEVEL OF CONCIOUSNESS: alert, oriented X 3      SPEECH: normal      THOUGHT PROCESS/ CONTENT: logical      AFFECTIVE FUNCTIONING: incongruent, superficial, significant use of humor      MOOD: irritable,      SYMBOLIC & THEMATIC CONTENT AS NOTED IN IMAGERY:  Pt was able to identify things she can control including her feelings, actions, and behaviors. Pt appears to have insight into her emotional reactivity - reporting that she usually acts up because of a multitude of factors and not the triggering event. Pt has difficulty turning insight into action as evidenced by her participation in group.   Pt was able to focus on the task and respond appropriately on topic, however she was easily distracted and social.      STATUS AFTER INTERVENTION: unchanged      RESPONSE TO LEARNING: able to verbalize current knowledge/ experience, able to verbalize/acknowledge new learning, capable of insight      ENDINGS: none reported     MODES OF INTERVENTION: exploration, art media, socialization, psychoeducation      DISCIPLINE RESPONSIBLE: Art Therapist      Marcial Crespo  Art Therapist, MA

## 2023-07-30 PROCEDURE — 99231 SBSQ HOSP IP/OBS SF/LOW 25: CPT | Performed by: PSYCHIATRY & NEUROLOGY

## 2023-07-30 PROCEDURE — 1240000000 HC EMOTIONAL WELLNESS R&B

## 2023-07-30 PROCEDURE — 6370000000 HC RX 637 (ALT 250 FOR IP): Performed by: FAMILY MEDICINE

## 2023-07-30 PROCEDURE — 6370000000 HC RX 637 (ALT 250 FOR IP): Performed by: PSYCHIATRY & NEUROLOGY

## 2023-07-30 PROCEDURE — 6370000000 HC RX 637 (ALT 250 FOR IP)

## 2023-07-30 RX ADMIN — CLOTRIMAZOLE AND BETAMETHASONE DIPROPIONATE: 10; .5 CREAM TOPICAL at 07:50

## 2023-07-30 RX ADMIN — TERBINAFINE TABLETS 250 MG 250 MG: 250 TABLET ORAL at 07:52

## 2023-07-30 RX ADMIN — FERROUS SULFATE TAB 325 MG (65 MG ELEMENTAL FE) 325 MG: 325 (65 FE) TAB at 07:51

## 2023-07-30 RX ADMIN — LITHIUM CARBONATE 450 MG: 300 CAPSULE, GELATIN COATED ORAL at 07:50

## 2023-07-30 RX ADMIN — TRAZODONE HYDROCHLORIDE 50 MG: 50 TABLET ORAL at 20:46

## 2023-07-30 RX ADMIN — LURASIDONE HYDROCHLORIDE 40 MG: 40 TABLET ORAL at 16:20

## 2023-07-30 RX ADMIN — LITHIUM CARBONATE 450 MG: 300 CAPSULE, GELATIN COATED ORAL at 16:20

## 2023-07-30 RX ADMIN — ACETAMINOPHEN 325MG 650 MG: 325 TABLET ORAL at 12:40

## 2023-07-30 ASSESSMENT — PAIN DESCRIPTION - DESCRIPTORS: DESCRIPTORS: ACHING;THROBBING

## 2023-07-30 ASSESSMENT — PAIN SCALES - GENERAL
PAINLEVEL_OUTOF10: 0
PAINLEVEL_OUTOF10: 5

## 2023-07-30 ASSESSMENT — PAIN DESCRIPTION - ORIENTATION: ORIENTATION: OTHER (COMMENT)

## 2023-07-30 ASSESSMENT — PAIN DESCRIPTION - LOCATION: LOCATION: OTHER (COMMENT)

## 2023-07-30 NOTE — GROUP NOTE
Group Therapy Note    Date: 7/29/2023    Group Start Time: 2000  Group End Time: 2015  Group Topic: Medication    [facility] 1 ADULT CHEM [id number] [facility], RN        Group Therapy Note    Attendees: 6       Patient's Goal:  Medication compliance     Notes:  Pt cooperative and receptive to information shared. Status After Intervention:  Unchanged    Participation Level:  Active Listener    Participation Quality: Appropriate      Speech:  normal      Thought Process/Content: Logical      Affective Functioning: Congruent      Mood: euthymic      Level of consciousness:  Alert and Oriented x4      Response to Learning: Able to retain information      Endings: None Reported    Modes of Intervention: Education and Support      Discipline Responsible: Registered Nurse      Signature:  Amanda Bingham RN

## 2023-07-31 VITALS
HEART RATE: 90 BPM | OXYGEN SATURATION: 100 % | WEIGHT: 250 LBS | SYSTOLIC BLOOD PRESSURE: 121 MMHG | BODY MASS INDEX: 37.89 KG/M2 | TEMPERATURE: 97.4 F | DIASTOLIC BLOOD PRESSURE: 78 MMHG | RESPIRATION RATE: 17 BRPM | HEIGHT: 68 IN

## 2023-07-31 LAB — LITHIUM SERPL-SCNC: 0.4 MMOL/L (ref 0.6–1.2)

## 2023-07-31 PROCEDURE — 6370000000 HC RX 637 (ALT 250 FOR IP): Performed by: FAMILY MEDICINE

## 2023-07-31 PROCEDURE — 80178 ASSAY OF LITHIUM: CPT

## 2023-07-31 PROCEDURE — 6370000000 HC RX 637 (ALT 250 FOR IP)

## 2023-07-31 PROCEDURE — 36415 COLL VENOUS BLD VENIPUNCTURE: CPT

## 2023-07-31 PROCEDURE — 6370000000 HC RX 637 (ALT 250 FOR IP): Performed by: PSYCHIATRY & NEUROLOGY

## 2023-07-31 PROCEDURE — 99238 HOSP IP/OBS DSCHRG MGMT 30/<: CPT | Performed by: PSYCHIATRY & NEUROLOGY

## 2023-07-31 RX ORDER — TRAZODONE HYDROCHLORIDE 50 MG/1
50 TABLET ORAL NIGHTLY
Qty: 30 TABLET | Refills: 0 | Status: SHIPPED | OUTPATIENT
Start: 2023-07-31

## 2023-07-31 RX ORDER — FERROUS SULFATE 325(65) MG
325 TABLET ORAL
Qty: 30 TABLET | Refills: 0 | Status: SHIPPED | OUTPATIENT
Start: 2023-08-01

## 2023-07-31 RX ORDER — CLOTRIMAZOLE AND BETAMETHASONE DIPROPIONATE 10; .64 MG/G; MG/G
CREAM TOPICAL
Qty: 15 G | Refills: 0 | Status: SHIPPED | OUTPATIENT
Start: 2023-07-31

## 2023-07-31 RX ORDER — TERBINAFINE HYDROCHLORIDE 250 MG/1
250 TABLET ORAL DAILY
Qty: 6 TABLET | Refills: 0 | Status: SHIPPED | OUTPATIENT
Start: 2023-08-01 | End: 2023-08-07

## 2023-07-31 RX ORDER — LURASIDONE HYDROCHLORIDE 40 MG/1
40 TABLET, FILM COATED ORAL
Qty: 30 TABLET | Refills: 0 | Status: SHIPPED | OUTPATIENT
Start: 2023-07-31

## 2023-07-31 RX ORDER — LITHIUM CARBONATE 150 MG/1
450 CAPSULE ORAL 2 TIMES DAILY WITH MEALS
Qty: 180 CAPSULE | Refills: 0 | Status: SHIPPED | OUTPATIENT
Start: 2023-07-31

## 2023-07-31 RX ADMIN — LITHIUM CARBONATE 450 MG: 300 CAPSULE, GELATIN COATED ORAL at 08:03

## 2023-07-31 RX ADMIN — CLOTRIMAZOLE AND BETAMETHASONE DIPROPIONATE: 10; .5 CREAM TOPICAL at 08:04

## 2023-07-31 RX ADMIN — LURASIDONE HYDROCHLORIDE 40 MG: 40 TABLET ORAL at 16:44

## 2023-07-31 RX ADMIN — ACETAMINOPHEN 325MG 650 MG: 325 TABLET ORAL at 10:33

## 2023-07-31 RX ADMIN — TERBINAFINE TABLETS 250 MG 250 MG: 250 TABLET ORAL at 08:03

## 2023-07-31 RX ADMIN — FERROUS SULFATE TAB 325 MG (65 MG ELEMENTAL FE) 325 MG: 325 (65 FE) TAB at 08:03

## 2023-07-31 RX ADMIN — LITHIUM CARBONATE 450 MG: 300 CAPSULE, GELATIN COATED ORAL at 16:44

## 2023-07-31 ASSESSMENT — PAIN - FUNCTIONAL ASSESSMENT: PAIN_FUNCTIONAL_ASSESSMENT: ACTIVITIES ARE NOT PREVENTED

## 2023-07-31 ASSESSMENT — PAIN DESCRIPTION - ORIENTATION: ORIENTATION: LOWER

## 2023-07-31 ASSESSMENT — PAIN DESCRIPTION - LOCATION: LOCATION: BACK

## 2023-07-31 ASSESSMENT — PAIN SCALES - GENERAL: PAINLEVEL_OUTOF10: 8

## 2023-07-31 NOTE — GROUP NOTE
Group Therapy Note    Date: 7/31/2023    Group Start Time: 1500  Group End Time: 4675  Group Topic: Music Therapy      Alverda Brunner        Group Therapy Note    Attendees: 6/8    Group Focus: Music therapy group explored the theme of resiliency through the interventions of jo ann analysis and songwriting. The group listened to and discussed two songs about resilience and worked on individual song poems. Group members were provided the opportunity to share and discuss their song poems and their reactions to the music. The group may promote self-expression, self-awareness, and use of music as a coping skill. Notes:  Pt attended group about 5-10 minutes late. Pt engaged in song discussions related to resiliency. Pt stated she did not think she could write a song, and then wrote and shared a song poem with the group. Status After Intervention:  Unchanged    Participation Level:  Active Listener and Interactive    Participation Quality: Appropriate, Attentive, and Sharing      Speech:  normal      Thought Process/Content: Logical  Linear      Affective Functioning: Congruent      Mood: euthymic      Level of consciousness:  Alert and Attentive      Response to Learning: Able to verbalize current knowledge/experience and Able to verbalize/acknowledge new learning      Endings: None Reported    Modes of Intervention: Support, Socialization, Exploration, Activity, and Media      Discipline Responsible: /Counselor      Signature:  Alverda Brunner, MT-BC, [facility]  Board-Certified Music Therapist  Licensed Professional Counselor

## 2023-07-31 NOTE — GROUP NOTE
Group Therapy Note    Date: 7/31/2023    Group Start Time: 0930  Group End Time: 8915  Group Topic: Recreational        Rubia Farhat        Group Therapy Note    Attendees: 6/8      Group Type: Exercise         This group focus on releasing endorphins and serotonin that help improve patients mood               Status After Intervention:  Unchanged    Participation Level:  Active Listener and Interactive    Participation Quality: Appropriate, Attentive, and Sharing      Speech:  normal      Thought Process/Content: Logical      Affective Functioning: Congruent      Mood: euthymic      Level of consciousness:  Alert and Attentive          Endings: None Reported      Modes of Intervention: Socialization, Activity, and Movement      Recreational Therapist  Ghada Bass

## 2023-07-31 NOTE — DISCHARGE SUMMARY
[address] [facility]    Name:  ALEXANDRA De Leon  MR#:   [id number]  :  2000  ACCOUNT #:  [de-identified]  ADMIT DATE:  2023  DISCHARGE DATE:  2023    IDENTIFYING DATA:  The patient is a 28-year-old single  female, resident of a group home in [facility]. She was having significant difficulties there and was presented to emergency room, saying she was hearing voices telling her to do bad things, could not contract for safety, and apparently had some threatening and dangerous behaviors toward others. She had episodes of tearing the house up and was making complaints about staff to the [facility]. She talked of shooting someone, but then denied having access to a gun or having a desire to actually go get a gun. She is supposed to be followed through the [facility], but she voiced her anger that she had been at this facility for about 8 months and had not been sent to see anybody for psychiatric care. She was taken to her family doctor. The family doctor said they would continue the lithium she was on, but did not want to do so and said the patient had to go see a mental health professional but could not get in to see anyone anywhere and it was unknown as to why this was the case. She was being taken to the REACH day support program at [facility] daily, but apparently was not seeing any prescribers there nor was she seeing prescribers at [facility] where she was supposed to be followed. Laboratory testing done in the emergency room included a basic metabolic panel with a slightly low potassium 3.4 mmol/L, slightly high random glucose 120 mg/dL, normal liver function panel, negative hCG, negative alcohol level, negative urine drug screen, negative acetaminophen and salicylate level. CBC showed her to have anemia with a hemoglobin 10.1 g/dL with an MCV 77.6 fl and an MCHC 29.8 g/dL.   She had normal none

## 2023-07-31 NOTE — GROUP NOTE
Group Therapy Note    Date: 7/31/2023    Group Start Time: 1400  Group End Time: 8396  Group Topic: Recreational    [facility] 1 ADULT CHEM DEP    Lisa Lee        Group Therapy Note    Attendees: 5/8      Group Type: Social-Emotional Jenga       Group Focus: Recreational therapy group involved a variety of questions that allowed the understanding of social skills and emotional self-awareness. Patients recognized feelings and skills, and how they can be associated with personal life events. Helping to enhance emotional and social competence,  mood, relaxation, problem solving and decrease stress. Status After Intervention:  Unchanged    Participation Level:  Active Listener and Interactive    Participation Quality: Appropriate, Attentive, and Sharing      Speech:  normal      Thought Process/Content: Logical      Affective Functioning: Congruent      Mood: euthymic      Level of consciousness:  Alert and Attentive      Endings: None Reported      Modes of Intervention: Socialization, Problem-solving, and Activity      Recreational Therapist  Lisa Lee

## 2023-07-31 NOTE — GROUP NOTE
Art Therapy Group Progress Note    PATIENT SCHEDULED FOR GROUP AT:  1:00 PM    GROUP STOP TIME:  1:20 PM     ATTENDANCE: Low (1/8 participants)     TOPIC / FOCUS: Road [employer]      GOALS:  define wellbeing, identify personal well being goals, identify barriers and blockades, discuss effective and ineffective coping skills, identify successes and areas of growth, promote positive coping skills and creative problem solving     PARTICIPATION LEVEL:  Odin  Art Therapist, MA